# Patient Record
Sex: MALE | Race: BLACK OR AFRICAN AMERICAN | NOT HISPANIC OR LATINO
[De-identification: names, ages, dates, MRNs, and addresses within clinical notes are randomized per-mention and may not be internally consistent; named-entity substitution may affect disease eponyms.]

---

## 2021-10-06 PROBLEM — Z00.00 ENCOUNTER FOR PREVENTIVE HEALTH EXAMINATION: Status: ACTIVE | Noted: 2021-10-06

## 2021-10-08 ENCOUNTER — APPOINTMENT (OUTPATIENT)
Dept: ENDOCRINOLOGY | Facility: CLINIC | Age: 21
End: 2021-10-08

## 2021-12-02 ENCOUNTER — APPOINTMENT (OUTPATIENT)
Dept: AFTER HOURS CARE | Facility: EMERGENCY ROOM | Age: 21
End: 2021-12-02

## 2021-12-02 DIAGNOSIS — R73.9 HYPERGLYCEMIA, UNSPECIFIED: ICD-10-CM

## 2021-12-02 DIAGNOSIS — U07.1 COVID-19: ICD-10-CM

## 2021-12-02 PROCEDURE — 99204 OFFICE O/P NEW MOD 45 MIN: CPT | Mod: 1L,95

## 2021-12-02 NOTE — ASSESSMENT
[FreeTextEntry1] : 21yoM; with PMH signif for DM and Obesity; now p/w bodyaches and fever x7days.  Found to be covid+ by at home antigen testing 1 day ago.  No respiratory distress.  Also has been non-compliant w/insulin for diabetes control x1 week.\par 1. Symptomatic COVID\par -receive official Antigen/PCR testing to be enrolled in antibody infusion-notified there is 10-day window for infusion\par -symptomatic and supportive care\par -tylenol/motrin for fever and pain control\par -isolation for 10 days\par -seek medical attention for shortness of breath, chest pain\par 2.  Uncontrolled Diabetes\par -patient non-compliant with Lantus 20units at bedtime x1 week, FS not taken until requested by undersigned MD, today 308.    advised that generally elevated blood sugar in setting of symptoms such as nausea, vomiting, abdominal pain may be indicative of DKA and may need attention in hospital.  Patient does not want to go to hospital at this time and states normal BS is 250-300s, even when well controlled. Encouraged to appropriately hydrate, take insulin, and monitor blood sugar.  Advised to go to hospital if blood glucose levels continue to rise, vomiting, abdominal pain, chest pain or any other acute causes of concern.

## 2021-12-02 NOTE — HISTORY OF PRESENT ILLNESS
[Home] : at home, [unfilled] , at the time of the visit. [Other Location: e.g. Home (Enter Location, City,State)___] : at [unfilled] [Mother] : mother [Verbal consent obtained from patient] : the patient, [unfilled] [FreeTextEntry8] : 21yoM; with PMH of DM and Obesity; now p/w bodyaches and fever x1 week, Te=498.6F, 5 days ago.  states he has no appetite and mild nausea for 1 week and mild epigastric pain, GI symptoms mostly resolved now.  bodyaches and fever persist.  denies vomiting. denies diarrhea.  tolerating PO. denies chest pain or shortness of breath.  c/o mild cough. reports he is not vaccinated.  \par reports he has not been taking his insulin since he became sick because he didn't know how the insulin would affect him during the illness. patient has also not checked his fingersticks for 1 week.\par PMH: DM1\par ALL: denies\par  [Parent] : parent

## 2021-12-02 NOTE — PLAN
[No new medications perscribed] : Treat in place: No new medications prescribed [FreeTextEntry1] : 21yoM; with PMH signif for DM and Obesity; now p/w bodyaches and fever x7days.  Found to be covid+ by at home antigen testing 1 day ago.  No respiratory distress.  Also has been non-compliant w/insulin for diabetes control x1 week.\par 1. Symptomatic COVID\par -receive official Antigen/PCR testing to be enrolled in antibody infusion-notified there is 10-day window for infusion\par -symptomatic and supportive care\par -tylenol/motrin for fever and pain control\par -isolation for 10 days\par -seek medical attention for shortness of breath, chest pain\par 2.  Uncontrolled Diabetes\par -patient non-compliant with Lantus 20units at bedtime x1 week, FS not taken until requested by undersigned MD, today 308.    advised that generally elevated blood sugar in setting of symptoms such as nausea, vomiting, abdominal pain may be indicative of DKA and may need attention in hospital.  Patient does not want to go to hospital at this time and states normal BS is 250-300s, even when well controlled. Encouraged to appropriately hydrate, take insulin, and monitor blood sugar.  Advised to go to hospital if blood glucose levels continue to rise, vomiting, abdominal pain, chest pain or any other acute causes of concern.

## 2021-12-02 NOTE — REVIEW OF SYSTEMS
[Fever] : fever [Chills] : chills [Fatigue] : fatigue [Recent Change In Weight] : ~T recent weight change [Chest Pain] : no chest pain [Palpitations] : no palpitations [Orthopena] : no orthopnea [Paroxysmal Nocturnal Dyspnea] : no paroxysmal nocturnal dyspnea [Shortness Of Breath] : no shortness of breath [Cough] : cough [Dyspnea on Exertion] : not dyspnea on exertion [Abdominal Pain] : abdominal pain [Diarrhea] : no diarrhea [Vomiting] : no vomiting [Muscle Pain] : muscle pain [Headache] : no headache [Dizziness] : no dizziness [Negative] : Genitourinary

## 2022-10-07 ENCOUNTER — APPOINTMENT (OUTPATIENT)
Dept: UROLOGY | Facility: CLINIC | Age: 22
End: 2022-10-07

## 2022-10-07 ENCOUNTER — RESULT CHARGE (OUTPATIENT)
Age: 22
End: 2022-10-07

## 2022-10-07 ENCOUNTER — TRANSCRIPTION ENCOUNTER (OUTPATIENT)
Age: 22
End: 2022-10-07

## 2022-10-07 VITALS
SYSTOLIC BLOOD PRESSURE: 140 MMHG | HEART RATE: 106 BPM | WEIGHT: 241 LBS | HEIGHT: 65 IN | OXYGEN SATURATION: 97 % | DIASTOLIC BLOOD PRESSURE: 90 MMHG | BODY MASS INDEX: 40.15 KG/M2 | TEMPERATURE: 96.6 F

## 2022-10-07 DIAGNOSIS — N50.812 LEFT TESTICULAR PAIN: ICD-10-CM

## 2022-10-07 DIAGNOSIS — E11.9 TYPE 2 DIABETES MELLITUS W/OUT COMPLICATIONS: ICD-10-CM

## 2022-10-07 DIAGNOSIS — N39.0 URINARY TRACT INFECTION, SITE NOT SPECIFIED: ICD-10-CM

## 2022-10-07 DIAGNOSIS — N47.8 OTHER DISORDERS OF PREPUCE: ICD-10-CM

## 2022-10-07 LAB
BILIRUB UR QL STRIP: NEGATIVE
CLARITY UR: CLEAR
COLLECTION METHOD: NORMAL
GLUCOSE UR-MCNC: 1000
HCG UR QL: 0.2 EU/DL
HGB UR QL STRIP.AUTO: NEGATIVE
KETONES UR-MCNC: 160
LEUKOCYTE ESTERASE UR QL STRIP: NEGATIVE
NITRITE UR QL STRIP: NEGATIVE
PH UR STRIP: 5.5
PROT UR STRIP-MCNC: NEGATIVE
SP GR UR STRIP: 1.02

## 2022-10-07 PROCEDURE — 99204 OFFICE O/P NEW MOD 45 MIN: CPT

## 2022-10-07 NOTE — ASSESSMENT
[FreeTextEntry1] : 22 year old male with poorly controlled T2DM here for inability to retract foreskin over penile glans, as well as L testicular pain x 2 weeks. Pt with UTI, ucx notes lactobacillus but no sensitivities. Pt completed course of Bactrim without improvement in symptoms. Normal scrotal US at [facility] on Saturday. Hgb A1C noted to be significantly elevated 12.6. Physical exam reassuring, no evidence of phimosis or paraphimosis. b/l scrotal/testicular exam reassuring. \par \par Plan: \par - Recommend eventual circumcision as pt unable to retract foreskin over penile glans when erect. tight foreskin on exam.\par - pt restarting pharmacologic management for diabetes- on Metformin and insulin. Recommend getting tighter control of blood sugar prior to circumcision procedure in order to facilitate better wound healing/postoperative outcomes and decrease risk of postoperative complications.\par - Will inquire with regard to culture sensitivities. In interim, rx given for Clindamycin 300mg PO QID x 5 days. \par - Return in 3 weeks with repeat A1C, blood sugar levels to help guide timing of surgery. \par

## 2022-10-07 NOTE — PHYSICAL EXAM
[] : no respiratory distress [Abdomen Soft] : soft [Normal Station and Gait] : the gait and station were normal for the patient's age [Skin Color & Pigmentation] : normal skin color and pigmentation [No Focal Deficits] : no focal deficits [FreeTextEntry1] : penis with normal foreskin covering glans, but unable to retract foreskin back over glans; testicles mildly TTP b/l, no evidence of erythema, edema.

## 2022-10-07 NOTE — HISTORY OF PRESENT ILLNESS
[FreeTextEntry1] : 23yo obese male here with PMHx of poorly controlled T2DM (HgbA1C of 12.6) here for urologic evaluation for tight foreskin since 2018 and acute L sided testicular pain that started about 2 weeks ago.\par \par Pt reports tight foreskin since 2018, with limited ability to retract the foreskin, pt has been unable to retract foreskin x 1 year, notes it is painful at times and "pink" in color. Unable to clean underneath. No hx of paraphimosis or ER visits for phimosis symptoms. \par \par Pt also started with L sided testicular pain. Went to [place] [facility] on on 10/2, US of testes/scrotum was normal. Pt was on bactrim for UTI sx, UCx + for lactobacillus, no sensitivities present. No improvement in testicular pain after completing abx. \par \par No hx of this occurring in the past, hx of undescended testes or testicular torsion. No hx of inciting trauma or event. Not currently sexually active. No hx of STIs. Non-smoker. No fam hx of  malignancy. Denies fevers, chills, flank pain, hx of kidney stones, urinary urgency, frequency, incontinence, dysuria, hematuria. \par \par Pt reports he was just restarted on Metformin and insulin, daily sugars are in the mid-200s. - last ENN5p=97\par UA today with > 1000 glucose, 160+ ketones. \par \par

## 2022-10-07 NOTE — LETTER BODY
[Dear  ___] : Dear  [unfilled], [Consult Letter:] : I had the pleasure of evaluating your patient, [unfilled]. [Consult Closing:] : Thank you very much for allowing me to participate in the care of this patient.  If you have any questions, please do not hesitate to contact me. [Sincerely,] : Sincerely, [FreeTextEntry3] : Fanta Campbell MD\par Urologic Oncology\par [facility]\par [facility] at [place]\par [facility]\par

## 2022-10-28 ENCOUNTER — RESULT CHARGE (OUTPATIENT)
Age: 22
End: 2022-10-28

## 2022-10-28 ENCOUNTER — APPOINTMENT (OUTPATIENT)
Dept: UROLOGY | Facility: CLINIC | Age: 22
End: 2022-10-28

## 2022-10-28 VITALS
TEMPERATURE: 96.4 F | DIASTOLIC BLOOD PRESSURE: 80 MMHG | HEIGHT: 65 IN | SYSTOLIC BLOOD PRESSURE: 128 MMHG | HEART RATE: 100 BPM | BODY MASS INDEX: 40.15 KG/M2 | WEIGHT: 241 LBS | OXYGEN SATURATION: 99 %

## 2022-10-28 DIAGNOSIS — K62.89 OTHER SPECIFIED DISEASES OF ANUS AND RECTUM: ICD-10-CM

## 2022-10-28 DIAGNOSIS — R10.2 PELVIC AND PERINEAL PAIN: ICD-10-CM

## 2022-10-28 LAB
BILIRUB UR QL STRIP: NEGATIVE
CLARITY UR: CLEAR
COLLECTION METHOD: NORMAL
GLUCOSE UR-MCNC: 1000
HCG UR QL: 0.2 EU/DL
HGB UR QL STRIP.AUTO: NORMAL
KETONES UR-MCNC: 15
LEUKOCYTE ESTERASE UR QL STRIP: NEGATIVE
NITRITE UR QL STRIP: NEGATIVE
PH UR STRIP: 5.5
PROT UR STRIP-MCNC: NEGATIVE
SP GR UR STRIP: 1.02

## 2022-10-28 PROCEDURE — 99213 OFFICE O/P EST LOW 20 MIN: CPT

## 2022-10-28 RX ORDER — IBUPROFEN 400 MG/1
400 TABLET, FILM COATED ORAL EVERY 6 HOURS
Qty: 60 | Refills: 0 | Status: ACTIVE | COMMUNITY
Start: 2022-10-28 | End: 1900-01-01

## 2022-10-28 RX ORDER — CLINDAMYCIN HYDROCHLORIDE 300 MG/1
300 CAPSULE ORAL EVERY 6 HOURS
Qty: 20 | Refills: 0 | Status: DISCONTINUED | COMMUNITY
Start: 2022-10-07 | End: 2022-10-28

## 2022-10-28 RX ORDER — LEVOFLOXACIN 500 MG/1
500 TABLET, FILM COATED ORAL
Qty: 10 | Refills: 0 | Status: ACTIVE | COMMUNITY
Start: 2022-10-28 | End: 1900-01-01

## 2022-10-28 NOTE — LETTER BODY
[FreeTextEntry3] : Fanta Campbell MD\par Urologic Oncology\par [facility]rology\par [facility] at [place]\par [facility]\par

## 2022-10-28 NOTE — PHYSICAL EXAM
[FreeTextEntry1] : normal evaluation of scrotum, perineum, no evidence of infection, erythema, edema. JOSÉ LUIS with moderate TTP of the prostate. Penis with normal foreskin covering glans, no evidence of phimosis/paraphimosis

## 2022-10-28 NOTE — ASSESSMENT
[FreeTextEntry1] : 22 year old male with poorly controlled T2DM here for inability to retract foreskin over penile glans, as well as L testicular pain x 2 weeks. Pt with UTI, ucx notes lactobacillus but no sensitivities. Pt completed course of Bactrim without improvement in symptoms. Normal scrotal US at [place] [facility] on Saturday. Hgb A1C noted to be significantly elevated 12.6. Physical exam reassuring, no evidence of phimosis or paraphimosis. b/l scrotal/testicular exam reassuring. \par FU 10/28/22: Pt reports some symptom improvement with clindamycin but the pain recurred shortly after completion of treatment. Notes perineal, rectal pain that radiates to the left side of the penis and left testicle. Pt's repeat A1C is pending- to see Dr. Colindres today. Denies fevers, chills, dysuria, hematuria. \par \par Plan: \par - Will speak with Dr. Colindres regarding A1C and diabetes control in order to optimize patient for future circumcision procedure\par - discussed that circumcision is indicated for tight foreskin but is not urgent as there is no acute phimosis. \par - Given tenderness on JOSÉ LUIS, will rx Levaquin 500mg PO x 10 days, motrin 400mg QID x 10 days PRN for pain. \par - Urine obtained, will send for repeat culture \par - CT abd/pelvis w/ IV contrast to r/o any possibility of developing subcutaneous pelvic/perineal infection r/o Sara's gangrene and to provide possible etiology of pt's pain as physical exam is wholly unremarkable and true prostatitis is not suspected to be whole cause of pt's pain given pt's age and lack of fever/chills. \par - follow up in 3 weeks \par \par All the patient's questions were answered to the best of my ability.\par \par

## 2022-10-28 NOTE — HISTORY OF PRESENT ILLNESS
[FreeTextEntry1] : 21yo obese male here with PMHx of poorly controlled T2DM (HgbA1C of 12.6) here for urologic evaluation for tight foreskin since 2018 and acute L sided testicular pain that started about 2 weeks ago.\par \par Pt reports tight foreskin since 2018, with limited ability to retract the foreskin, pt has been unable to retract foreskin x 1 year, notes it is painful at times and "pink" in color. Unable to clean underneath. No hx of paraphimosis or ER visits for phimosis symptoms. \par \par Pt also started with L sided testicular pain. Went to [place] ER on on 10/2, US of testes/scrotum was normal. Pt was on bactrim for UTI sx, UCx + for lactobacillus, no sensitivities present. No improvement in testicular pain after completing abx. \par \par No hx of this occurring in the past, hx of undescended testes or testicular torsion. No hx of inciting trauma or event. Not currently sexually active. No hx of STIs. Non-smoker. No fam hx of  malignancy. Denies fevers, chills, flank pain, hx of kidney stones, urinary urgency, frequency, incontinence, dysuria, hematuria. \par \par Pt reports he was just restarted on Metformin and insulin, daily sugars are in the mid-200s. - last [id number]=56\par UA today with > 1000 glucose, 160+ ketones. \par \par \par FU 10/28/22: Pt reports some symptom improvement with clindamycin but the pain recurred shortly after completion of treatment. Notes perineal, rectal pain that radiates to the left side of the penis and left testicle. Pt's repeat A1C is pending- to see Dr. Colindres today. Denies fevers, chills, dysuria, hematuria. \par

## 2022-10-31 LAB — BACTERIA UR CULT: NORMAL

## 2022-11-08 ENCOUNTER — APPOINTMENT (OUTPATIENT)
Dept: CT IMAGING | Facility: CLINIC | Age: 22
End: 2022-11-08

## 2022-11-21 ENCOUNTER — OUTPATIENT (OUTPATIENT)
Dept: OUTPATIENT SERVICES | Facility: HOSPITAL | Age: 22
LOS: 1 days | End: 2022-11-21
Payer: MEDICAID

## 2022-11-21 ENCOUNTER — APPOINTMENT (OUTPATIENT)
Dept: CT IMAGING | Facility: CLINIC | Age: 22
End: 2022-11-21

## 2022-11-21 DIAGNOSIS — N50.812 LEFT TESTICULAR PAIN: ICD-10-CM

## 2022-11-21 DIAGNOSIS — N39.0 URINARY TRACT INFECTION, SITE NOT SPECIFIED: ICD-10-CM

## 2022-11-21 PROCEDURE — 74178 CT ABD&PLV WO CNTR FLWD CNTR: CPT | Mod: 26

## 2022-11-21 PROCEDURE — 74178 CT ABD&PLV WO CNTR FLWD CNTR: CPT

## 2022-12-02 ENCOUNTER — APPOINTMENT (OUTPATIENT)
Dept: UROLOGY | Facility: CLINIC | Age: 22
End: 2022-12-02

## 2022-12-02 VITALS
WEIGHT: 241 LBS | BODY MASS INDEX: 40.15 KG/M2 | TEMPERATURE: 96.4 F | SYSTOLIC BLOOD PRESSURE: 144 MMHG | DIASTOLIC BLOOD PRESSURE: 98 MMHG | HEIGHT: 65 IN | OXYGEN SATURATION: 97 % | HEART RATE: 83 BPM

## 2022-12-02 DIAGNOSIS — N47.1 PHIMOSIS: ICD-10-CM

## 2022-12-02 PROCEDURE — 99214 OFFICE O/P EST MOD 30 MIN: CPT

## 2022-12-02 NOTE — PHYSICAL EXAM
[Abdomen Soft] : soft [Skin Color & Pigmentation] : normal skin color and pigmentation [] : no respiratory distress [Normal Station and Gait] : the gait and station were normal for the patient's age [No Focal Deficits] : no focal deficits [FreeTextEntry1] : normal evaluation of scrotum, perineum, no evidence of infection, erythema, edema. JOSÉ LUIS with moderate TTP of the prostate. Penis with normal foreskin covering glans, no evidence of phimosis/paraphimosis

## 2022-12-02 NOTE — LETTER BODY
[Dear  ___] : Dear  [unfilled], [Consult Letter:] : I had the pleasure of evaluating your patient, [unfilled]. [Consult Closing:] : Thank you very much for allowing me to participate in the care of this patient.  If you have any questions, please do not hesitate to contact me. [Sincerely,] : Sincerely, [FreeTextEntry3] : Fanta Campbell MD\par Urologic Oncology\par [facility] Endoscopic urology\par [facility] of Urology at [place]\par [facility]\par

## 2022-12-02 NOTE — ASSESSMENT
[FreeTextEntry1] : 22 year old male with poorly controlled T2DM here for inability to retract foreskin over penile glans, as well as L testicular pain x 2 weeks. Pt with UTI, ucx notes lactobacillus but no sensitivities. Pt completed course of Bactrim without improvement in symptoms. Normal scrotal US at [facility] on Saturday. Hgb A1C noted to be significantly elevated 12.6. Physical exam reassuring, no evidence of phimosis or paraphimosis. b/l scrotal/testicular exam reassuring. \par FU 10/28/22: Pt reports some symptom improvement with clindamycin but the pain recurred shortly after completion of treatment. Notes perineal, rectal pain that radiates to the left side of the penis and left testicle. \par repeat A1C is 9 (10/28/2022).\par \par Dec 2, 2022: HbA1C dropping. today office HbA1C = pending / CT abd pelvis w cont: reviewed with patient: no evidence of leon or any pelvic/abdom infection\par \par Plan: \par FU HBA1C\par Schedule circumcision Jan 2022\par \par Details regarding indications, risks, and benefits of the procedure were thoroughly explained to the patient.\par All images and labs were reviewed and explained thoroughly\par All the patient's questions were answered to the best of my ability.\par \par

## 2022-12-02 NOTE — HISTORY OF PRESENT ILLNESS
[FreeTextEntry1] : Past uro Hx: 23 yo obese male here with PMHx of poorly controlled T2DM (HgbA1C of 12.6 in 09/28/2022) here for urologic evaluation for tight foreskin since 2018 and acute L sided testicular pain that started about 2 weeks ago.\par \par Pt reports tight foreskin since 2018, with limited ability to retract the foreskin. No hx of paraphimosis or ER visits for phimosis symptoms. \par \par Pt also started with L sided testicular pain. Went to [place] ER on on 10/2, US of testes/scrotum was normal. Pt was on bactrim for UTI sx, UCx + for lactobacillus, no sensitivities present. No improvement in testicular pain after completing abx. \par \par No hx of this occurring in the past, hx of undescended testes or testicular torsion. No hx of inciting trauma or event. Not currently sexually active. No hx of STIs. Non-smoker. No fam hx of  malignancy. Denies fevers, chills, flank pain, hx of kidney stones, urinary urgency, frequency, incontinence, dysuria, hematuria. \par \par Pt reports he was just restarted on Metformin and insulin, daily sugars are in the mid-200s. - last CHH2w=06\par UA today with > 1000 glucose, 160+ ketones. \par \par FU 10/28/22: Pt reports some symptom improvement with clindamycin but the pain recurred shortly after completion of treatment. Notes perineal, rectal pain that radiates to the left side of the penis and left testicle. Pt's repeat A1C is 9 (10/28/2022)\par Dec 2, 2022: HbA1C dropping. today office HbA1C = pending / CT abd pelvis w cont: reviewed with patient: no evidence of leon or any pelvic/abdom infection\par

## 2023-01-31 ENCOUNTER — APPOINTMENT (OUTPATIENT)
Dept: UROLOGY | Facility: HOSPITAL | Age: 23
End: 2023-01-31
Payer: MEDICAID

## 2023-01-31 PROCEDURE — 88304 TISSUE EXAM BY PATHOLOGIST: CPT | Mod: 26

## 2023-02-01 ENCOUNTER — RESULT REVIEW (OUTPATIENT)
Age: 23
End: 2023-02-01

## 2023-02-17 ENCOUNTER — APPOINTMENT (OUTPATIENT)
Dept: UROLOGY | Facility: CLINIC | Age: 23
End: 2023-02-17
Payer: MEDICAID

## 2023-02-17 VITALS
WEIGHT: 275 LBS | DIASTOLIC BLOOD PRESSURE: 86 MMHG | OXYGEN SATURATION: 97 % | HEIGHT: 65 IN | TEMPERATURE: 96.2 F | BODY MASS INDEX: 45.82 KG/M2 | HEART RATE: 87 BPM | SYSTOLIC BLOOD PRESSURE: 126 MMHG

## 2023-02-17 PROCEDURE — 99213 OFFICE O/P EST LOW 20 MIN: CPT

## 2023-02-17 NOTE — ASSESSMENT
[FreeTextEntry1] : 22 year old male with poorly controlled T2DM here for inability to retract foreskin over penile glans, as well as L testicular pain x 2 weeks. Pt with UTI, ucx notes lactobacillus but no sensitivities. Pt completed course of Bactrim without improvement in symptoms. Normal scrotal US at [facility] [place] on Saturday. Hgb A1C noted to be significantly elevated 12.6. Physical exam reassuring, no evidence of phimosis or paraphimosis. b/l scrotal/testicular exam reassuring. \par \par Feb 2023: 3 weeks post circumcision\par healing well. no complaints\par path benign\par \par Plan: \par FU 2 months

## 2023-02-17 NOTE — HISTORY OF PRESENT ILLNESS
[FreeTextEntry1] : Past uro Hx: 23 yo obese male here with PMHx of poorly controlled T2DM (HgbA1C of 12.6 in 09/28/2022) here for urologic evaluation for tight foreskin since 2018 and acute L sided testicular pain that started about 2 weeks ago.\par \par Pt reports tight foreskin since 2018, with limited ability to retract the foreskin. No hx of paraphimosis or ER visits for phimosis symptoms. \par \par Feb 2023: 3 weeks post circumcision\par healing well. no complaints\par path benign

## 2023-02-17 NOTE — LETTER BODY
[Dear  ___] : Dear  [unfilled], [Consult Letter:] : I had the pleasure of evaluating your patient, [unfilled]. [Consult Closing:] : Thank you very much for allowing me to participate in the care of this patient.  If you have any questions, please do not hesitate to contact me. [Sincerely,] : Sincerely, [FreeTextEntry3] : Fanta Campbell MD\par Urologic Oncology\par [facility] Endoscopic urology\par [facility] at [place]\par [facility]\par

## 2023-02-17 NOTE — PHYSICAL EXAM
[Abdomen Soft] : soft [FreeTextEntry1] : normal evaluation of scrotum, perineum, no evidence of infection, erythema, edema. JOSÉ LUIS with moderate TTP of the prostate. Penis with normal foreskin covering glans, no evidence of phimosis/paraphimosis [Skin Color & Pigmentation] : normal skin color and pigmentation [] : no respiratory distress [Normal Station and Gait] : the gait and station were normal for the patient's age [No Focal Deficits] : no focal deficits